# Patient Record
Sex: FEMALE | Race: AMERICAN INDIAN OR ALASKA NATIVE | NOT HISPANIC OR LATINO
[De-identification: names, ages, dates, MRNs, and addresses within clinical notes are randomized per-mention and may not be internally consistent; named-entity substitution may affect disease eponyms.]

---

## 2019-05-08 PROBLEM — Z00.00 ENCOUNTER FOR PREVENTIVE HEALTH EXAMINATION: Status: ACTIVE | Noted: 2019-05-08

## 2019-05-28 ENCOUNTER — APPOINTMENT (OUTPATIENT)
Dept: NEUROSURGERY | Facility: CLINIC | Age: 54
End: 2019-05-28
Payer: MEDICAID

## 2019-05-28 VITALS — HEIGHT: 63 IN | WEIGHT: 145 LBS | BODY MASS INDEX: 25.69 KG/M2

## 2019-05-28 DIAGNOSIS — Z87.442 PERSONAL HISTORY OF URINARY CALCULI: ICD-10-CM

## 2019-05-28 DIAGNOSIS — Z82.49 FAMILY HISTORY OF ISCHEMIC HEART DISEASE AND OTHER DISEASES OF THE CIRCULATORY SYSTEM: ICD-10-CM

## 2019-05-28 DIAGNOSIS — Z86.39 PERSONAL HISTORY OF OTHER ENDOCRINE, NUTRITIONAL AND METABOLIC DISEASE: ICD-10-CM

## 2019-05-28 DIAGNOSIS — Z86.79 PERSONAL HISTORY OF OTHER DISEASES OF THE CIRCULATORY SYSTEM: ICD-10-CM

## 2019-05-28 DIAGNOSIS — Z83.3 FAMILY HISTORY OF DIABETES MELLITUS: ICD-10-CM

## 2019-05-28 PROCEDURE — 99204 OFFICE O/P NEW MOD 45 MIN: CPT

## 2019-05-28 RX ORDER — SIMVASTATIN 10 MG/1
10 TABLET, FILM COATED ORAL
Refills: 0 | Status: ACTIVE | COMMUNITY

## 2019-05-28 RX ORDER — OMEGA-3/DHA/EPA/FISH OIL 300-1000MG
1000 CAPSULE ORAL
Refills: 0 | Status: ACTIVE | COMMUNITY

## 2019-05-28 RX ORDER — LOSARTAN POTASSIUM 25 MG/1
25 TABLET, FILM COATED ORAL
Refills: 0 | Status: ACTIVE | COMMUNITY

## 2019-05-28 RX ORDER — METOPROLOL TARTRATE 25 MG/1
25 TABLET, FILM COATED ORAL
Refills: 0 | Status: ACTIVE | COMMUNITY

## 2019-05-28 RX ORDER — ALOGLIPTIN 25 MG/1
25 TABLET, FILM COATED ORAL
Refills: 0 | Status: ACTIVE | COMMUNITY

## 2019-05-28 RX ORDER — VITAMIN E (DL,TOCOPHERYL ACET) 180 MG
CAPSULE ORAL
Refills: 0 | Status: ACTIVE | COMMUNITY

## 2019-05-28 RX ORDER — GABAPENTIN 300 MG
300 TABLET ORAL
Refills: 0 | Status: ACTIVE | COMMUNITY

## 2019-05-28 RX ORDER — B-COMPLEX WITH VITAMIN C
TABLET ORAL
Refills: 0 | Status: ACTIVE | COMMUNITY

## 2019-05-28 RX ORDER — METFORMIN HYDROCHLORIDE 500 MG/1
500 TABLET, COATED ORAL
Refills: 0 | Status: ACTIVE | COMMUNITY

## 2019-05-28 RX ORDER — ASPIRIN 81 MG/1
81 TABLET, CHEWABLE ORAL
Refills: 0 | Status: ACTIVE | COMMUNITY

## 2019-05-30 NOTE — HISTORY OF PRESENT ILLNESS
[de-identified] : This is a 54 yrs old Cantonese speaking only female (  ID #[id number]) who presents today for a consultation of low back pain radiating to the right posterior leg down to her ankle since October 2018. It is associated with numbness and occasional tingling and weakness. Patient has tried massage and acupuncture, which she reports did not alleviate her symptoms. She has never participated in physical therapy. Patient was referred by Dr. Gentile who has given injections to the patient which was working in the past, but recently the injections stopped providing relief to the patient. \par \par MRI of the lumbar spine without contrast done on 4/25/19 showed L4-5 severe facet degenerative bilaterally. Anterolisthesis of L4 on L5 due to facet degenerative disease. L5-S1 moderate foraminal narrowing.

## 2019-06-04 ENCOUNTER — APPOINTMENT (OUTPATIENT)
Dept: NEUROSURGERY | Facility: CLINIC | Age: 54
End: 2019-06-04
Payer: MEDICAID

## 2019-06-04 VITALS — WEIGHT: 145 LBS | HEIGHT: 63 IN | BODY MASS INDEX: 25.69 KG/M2

## 2019-06-04 PROCEDURE — 99215 OFFICE O/P EST HI 40 MIN: CPT

## 2019-06-04 RX ORDER — FENOFIBRATE 160 MG/1
160 TABLET ORAL
Refills: 0 | Status: ACTIVE | COMMUNITY

## 2019-06-10 NOTE — HISTORY OF PRESENT ILLNESS
[FreeTextEntry1] : Patient presents today accompanied by her son, used Cantonese  ID #[id number], complaining of low back pain radiating posterior to the right leg. It is associated with numbness, tingling, and weakness. She is here with an xray of the lumbar spine done on 5/28/19 showed grade 1 anterolisthesis of L4 over L5 with left facet arthropathy without dynamic instability.

## 2019-06-21 RX ORDER — IBUPROFEN 600 MG/1
600 TABLET, FILM COATED ORAL 3 TIMES DAILY
Qty: 90 | Refills: 2 | Status: ACTIVE | COMMUNITY
Start: 1900-01-01 | End: 1900-01-01

## 2019-07-05 ENCOUNTER — INPATIENT (INPATIENT)
Facility: HOSPITAL | Age: 54
LOS: 3 days | Discharge: HOME | End: 2019-07-09
Attending: STUDENT IN AN ORGANIZED HEALTH CARE EDUCATION/TRAINING PROGRAM | Admitting: STUDENT IN AN ORGANIZED HEALTH CARE EDUCATION/TRAINING PROGRAM
Payer: MEDICAID

## 2019-07-05 ENCOUNTER — RESULT REVIEW (OUTPATIENT)
Age: 54
End: 2019-07-05

## 2019-07-05 VITALS
TEMPERATURE: 98 F | WEIGHT: 149.03 LBS | HEIGHT: 63 IN | SYSTOLIC BLOOD PRESSURE: 139 MMHG | HEART RATE: 71 BPM | RESPIRATION RATE: 18 BRPM | DIASTOLIC BLOOD PRESSURE: 94 MMHG

## 2019-07-05 DIAGNOSIS — M43.16 SPONDYLOLISTHESIS, LUMBAR REGION: ICD-10-CM

## 2019-07-05 DIAGNOSIS — I10 ESSENTIAL (PRIMARY) HYPERTENSION: ICD-10-CM

## 2019-07-05 DIAGNOSIS — Z87.442 PERSONAL HISTORY OF URINARY CALCULI: Chronic | ICD-10-CM

## 2019-07-05 DIAGNOSIS — Z98.890 OTHER SPECIFIED POSTPROCEDURAL STATES: Chronic | ICD-10-CM

## 2019-07-05 DIAGNOSIS — M48.061 SPINAL STENOSIS, LUMBAR REGION WITHOUT NEUROGENIC CLAUDICATION: ICD-10-CM

## 2019-07-05 DIAGNOSIS — E11.9 TYPE 2 DIABETES MELLITUS WITHOUT COMPLICATIONS: ICD-10-CM

## 2019-07-05 DIAGNOSIS — M54.16 RADICULOPATHY, LUMBAR REGION: ICD-10-CM

## 2019-07-05 DIAGNOSIS — Z87.442 PERSONAL HISTORY OF URINARY CALCULI: ICD-10-CM

## 2019-07-05 DIAGNOSIS — E78.5 HYPERLIPIDEMIA, UNSPECIFIED: ICD-10-CM

## 2019-07-05 LAB
GLUCOSE BLDC GLUCOMTR-MCNC: 134 MG/DL — HIGH (ref 70–99)
GLUCOSE BLDC GLUCOMTR-MCNC: 156 MG/DL — HIGH (ref 70–99)
GLUCOSE BLDC GLUCOMTR-MCNC: 164 MG/DL — HIGH (ref 70–99)
GLUCOSE BLDC GLUCOMTR-MCNC: 181 MG/DL — HIGH (ref 70–99)

## 2019-07-05 PROCEDURE — 22853 INSJ BIOMECHANICAL DEVICE: CPT

## 2019-07-05 PROCEDURE — 22630 ARTHRD PST TQ 1NTRSPC LUM: CPT

## 2019-07-05 PROCEDURE — 99024 POSTOP FOLLOW-UP VISIT: CPT

## 2019-07-05 PROCEDURE — 22840 INSERT SPINE FIXATION DEVICE: CPT

## 2019-07-05 PROCEDURE — 63012 REMOVE LAMINA/FACETS LUMBAR: CPT | Mod: AS

## 2019-07-05 PROCEDURE — 88304 TISSUE EXAM BY PATHOLOGIST: CPT | Mod: 26

## 2019-07-05 PROCEDURE — 22853 INSJ BIOMECHANICAL DEVICE: CPT | Mod: AS

## 2019-07-05 PROCEDURE — 88311 DECALCIFY TISSUE: CPT | Mod: 26

## 2019-07-05 PROCEDURE — 63012 REMOVE LAMINA/FACETS LUMBAR: CPT | Mod: 59

## 2019-07-05 PROCEDURE — 22840 INSERT SPINE FIXATION DEVICE: CPT | Mod: AS

## 2019-07-05 RX ORDER — METOPROLOL TARTRATE 50 MG
1 TABLET ORAL
Qty: 0 | Refills: 0 | DISCHARGE

## 2019-07-05 RX ORDER — HEPARIN SODIUM 5000 [USP'U]/ML
5000 INJECTION INTRAVENOUS; SUBCUTANEOUS EVERY 8 HOURS
Refills: 0 | Status: DISCONTINUED | OUTPATIENT
Start: 2019-07-06 | End: 2019-07-09

## 2019-07-05 RX ORDER — CEFAZOLIN SODIUM 1 G
1000 VIAL (EA) INJECTION EVERY 8 HOURS
Refills: 0 | Status: COMPLETED | OUTPATIENT
Start: 2019-07-05 | End: 2019-07-06

## 2019-07-05 RX ORDER — DEXAMETHASONE 0.5 MG/5ML
4 ELIXIR ORAL ONCE
Refills: 0 | Status: DISCONTINUED | OUTPATIENT
Start: 2019-07-05 | End: 2019-07-05

## 2019-07-05 RX ORDER — INSULIN LISPRO 100/ML
5 VIAL (ML) SUBCUTANEOUS
Refills: 0 | Status: DISCONTINUED | OUTPATIENT
Start: 2019-07-05 | End: 2019-07-09

## 2019-07-05 RX ORDER — FENOFIBRATE,MICRONIZED 130 MG
1 CAPSULE ORAL
Qty: 0 | Refills: 0 | DISCHARGE

## 2019-07-05 RX ORDER — DEXTROSE 50 % IN WATER 50 %
15 SYRINGE (ML) INTRAVENOUS ONCE
Refills: 0 | Status: DISCONTINUED | OUTPATIENT
Start: 2019-07-05 | End: 2019-07-09

## 2019-07-05 RX ORDER — METOPROLOL TARTRATE 50 MG
25 TABLET ORAL DAILY
Refills: 0 | Status: DISCONTINUED | OUTPATIENT
Start: 2019-07-05 | End: 2019-07-09

## 2019-07-05 RX ORDER — DEXTROSE 50 % IN WATER 50 %
25 SYRINGE (ML) INTRAVENOUS ONCE
Refills: 0 | Status: DISCONTINUED | OUTPATIENT
Start: 2019-07-05 | End: 2019-07-09

## 2019-07-05 RX ORDER — LOSARTAN POTASSIUM 100 MG/1
25 TABLET, FILM COATED ORAL DAILY
Refills: 0 | Status: DISCONTINUED | OUTPATIENT
Start: 2019-07-05 | End: 2019-07-09

## 2019-07-05 RX ORDER — LOSARTAN POTASSIUM 100 MG/1
1 TABLET, FILM COATED ORAL
Qty: 0 | Refills: 0 | DISCHARGE

## 2019-07-05 RX ORDER — ALOGLIPTIN 12.5 MG/1
1 TABLET, FILM COATED ORAL
Qty: 0 | Refills: 0 | DISCHARGE

## 2019-07-05 RX ORDER — MORPHINE SULFATE 50 MG/1
4 CAPSULE, EXTENDED RELEASE ORAL
Refills: 0 | Status: DISCONTINUED | OUTPATIENT
Start: 2019-07-05 | End: 2019-07-05

## 2019-07-05 RX ORDER — ONDANSETRON 8 MG/1
4 TABLET, FILM COATED ORAL ONCE
Refills: 0 | Status: DISCONTINUED | OUTPATIENT
Start: 2019-07-05 | End: 2019-07-05

## 2019-07-05 RX ORDER — DEXTROSE 50 % IN WATER 50 %
12.5 SYRINGE (ML) INTRAVENOUS ONCE
Refills: 0 | Status: DISCONTINUED | OUTPATIENT
Start: 2019-07-05 | End: 2019-07-09

## 2019-07-05 RX ORDER — CHOLECALCIFEROL (VITAMIN D3) 125 MCG
500000 CAPSULE ORAL
Qty: 0 | Refills: 0 | DISCHARGE

## 2019-07-05 RX ORDER — DOCUSATE SODIUM 100 MG
100 CAPSULE ORAL THREE TIMES A DAY
Refills: 0 | Status: DISCONTINUED | OUTPATIENT
Start: 2019-07-05 | End: 2019-07-09

## 2019-07-05 RX ORDER — INSULIN GLARGINE 100 [IU]/ML
15 INJECTION, SOLUTION SUBCUTANEOUS AT BEDTIME
Refills: 0 | Status: DISCONTINUED | OUTPATIENT
Start: 2019-07-05 | End: 2019-07-09

## 2019-07-05 RX ORDER — FENOFIBRATE,MICRONIZED 130 MG
145 CAPSULE ORAL DAILY
Refills: 0 | Status: DISCONTINUED | OUTPATIENT
Start: 2019-07-05 | End: 2019-07-09

## 2019-07-05 RX ORDER — SODIUM CHLORIDE 9 MG/ML
1000 INJECTION INTRAMUSCULAR; INTRAVENOUS; SUBCUTANEOUS
Refills: 0 | Status: DISCONTINUED | OUTPATIENT
Start: 2019-07-05 | End: 2019-07-06

## 2019-07-05 RX ORDER — SENNA PLUS 8.6 MG/1
2 TABLET ORAL AT BEDTIME
Refills: 0 | Status: DISCONTINUED | OUTPATIENT
Start: 2019-07-05 | End: 2019-07-09

## 2019-07-05 RX ORDER — GLUCAGON INJECTION, SOLUTION 0.5 MG/.1ML
1 INJECTION, SOLUTION SUBCUTANEOUS ONCE
Refills: 0 | Status: DISCONTINUED | OUTPATIENT
Start: 2019-07-05 | End: 2019-07-09

## 2019-07-05 RX ORDER — SIMVASTATIN 20 MG/1
10 TABLET, FILM COATED ORAL AT BEDTIME
Refills: 0 | Status: DISCONTINUED | OUTPATIENT
Start: 2019-07-05 | End: 2019-07-09

## 2019-07-05 RX ORDER — SODIUM CHLORIDE 9 MG/ML
1000 INJECTION, SOLUTION INTRAVENOUS
Refills: 0 | Status: DISCONTINUED | OUTPATIENT
Start: 2019-07-05 | End: 2019-07-09

## 2019-07-05 RX ORDER — METFORMIN HYDROCHLORIDE 850 MG/1
1 TABLET ORAL
Qty: 0 | Refills: 0 | DISCHARGE

## 2019-07-05 RX ORDER — SIMVASTATIN 20 MG/1
1 TABLET, FILM COATED ORAL
Qty: 0 | Refills: 0 | DISCHARGE

## 2019-07-05 RX ORDER — MORPHINE SULFATE 50 MG/1
30 CAPSULE, EXTENDED RELEASE ORAL
Refills: 0 | Status: DISCONTINUED | OUTPATIENT
Start: 2019-07-05 | End: 2019-07-06

## 2019-07-05 RX ORDER — METHOCARBAMOL 500 MG/1
750 TABLET, FILM COATED ORAL EVERY 8 HOURS
Refills: 0 | Status: DISCONTINUED | OUTPATIENT
Start: 2019-07-05 | End: 2019-07-09

## 2019-07-05 RX ORDER — ACETAMINOPHEN 500 MG
650 TABLET ORAL EVERY 4 HOURS
Refills: 0 | Status: DISCONTINUED | OUTPATIENT
Start: 2019-07-05 | End: 2019-07-09

## 2019-07-05 RX ORDER — INSULIN LISPRO 100/ML
VIAL (ML) SUBCUTANEOUS
Refills: 0 | Status: DISCONTINUED | OUTPATIENT
Start: 2019-07-05 | End: 2019-07-09

## 2019-07-05 RX ORDER — MORPHINE SULFATE 50 MG/1
2 CAPSULE, EXTENDED RELEASE ORAL
Refills: 0 | Status: DISCONTINUED | OUTPATIENT
Start: 2019-07-05 | End: 2019-07-05

## 2019-07-05 RX ORDER — SODIUM CHLORIDE 9 MG/ML
1000 INJECTION INTRAMUSCULAR; INTRAVENOUS; SUBCUTANEOUS
Refills: 0 | Status: DISCONTINUED | OUTPATIENT
Start: 2019-07-05 | End: 2019-07-05

## 2019-07-05 RX ORDER — MEPERIDINE HYDROCHLORIDE 50 MG/ML
12.5 INJECTION INTRAMUSCULAR; INTRAVENOUS; SUBCUTANEOUS
Refills: 0 | Status: DISCONTINUED | OUTPATIENT
Start: 2019-07-05 | End: 2019-07-05

## 2019-07-05 RX ADMIN — MORPHINE SULFATE 2 MILLIGRAM(S): 50 CAPSULE, EXTENDED RELEASE ORAL at 15:00

## 2019-07-05 RX ADMIN — Medication 2: at 18:04

## 2019-07-05 RX ADMIN — SENNA PLUS 2 TABLET(S): 8.6 TABLET ORAL at 21:04

## 2019-07-05 RX ADMIN — Medication 100 MILLIGRAM(S): at 21:04

## 2019-07-05 RX ADMIN — Medication 100 MILLIGRAM(S): at 21:03

## 2019-07-05 RX ADMIN — SIMVASTATIN 10 MILLIGRAM(S): 20 TABLET, FILM COATED ORAL at 21:04

## 2019-07-05 RX ADMIN — MORPHINE SULFATE 2 MILLIGRAM(S): 50 CAPSULE, EXTENDED RELEASE ORAL at 15:24

## 2019-07-05 RX ADMIN — INSULIN GLARGINE 15 UNIT(S): 100 INJECTION, SOLUTION SUBCUTANEOUS at 23:08

## 2019-07-05 RX ADMIN — SODIUM CHLORIDE 75 MILLILITER(S): 9 INJECTION INTRAMUSCULAR; INTRAVENOUS; SUBCUTANEOUS at 14:26

## 2019-07-05 RX ADMIN — MORPHINE SULFATE 30 MILLILITER(S): 50 CAPSULE, EXTENDED RELEASE ORAL at 15:30

## 2019-07-05 NOTE — ASU PREOP CHECKLIST - 1.
[facility]  # [id number].  Patient declined pacific  and requested for Hung Pan, son to interpretret.

## 2019-07-05 NOTE — ASU PATIENT PROFILE, ADULT - INTERPRETATION SERVICES DECLINED
Patient/Caregiver requests family/friend to interpret. #[id number] refused interperter Emani/Patient/Caregiver requests family/friend to interpret.

## 2019-07-05 NOTE — CHART NOTE - NSCHARTNOTEFT_GEN_A_CORE
PACU ANESTHESIA ADMISSION NOTE      Procedure: lumbar 4-5 transforaminal interbody fusion    Post op diagnosis:  lumbar spondylosis     ____  Intubated  TV:______       Rate: ______      FiO2: ______    _x___  Patent Airway    _x___  Full return of protective reflexes    _x___  Full recovery from anesthesia / back to baseline status    Vitals:  T(F): 97.5   HR: 76  BP: 144/81  RR: 19  SpO2: 96%    Mental Status:  _x___ Awake   __x___ Alert   _____ Drowsy   _____ Sedated    Nausea/Vomiting:  _x___  NO       ______Yes,   See Post - Op Orders         Pain Scale (0-10):  __0___    Treatment: ____ None    _x___ See Post - Op/PCA Orders    Post - Operative Fluids:   ____ Oral   _x___ See Post - Op Orders    Plan: Discharge:   ____Home       __x___Floor     _____Critical Care    _____  Other:_________________    Comments:  No anesthesia issues or complications noted.  Discharge when criteria met.

## 2019-07-05 NOTE — BRIEF OPERATIVE NOTE - OPERATION/FINDINGS
grade I spondylolisthesis, severe facet arthropathy, foraminal stenosis. Right L4-5TLIF, K2M instrumentation

## 2019-07-06 LAB
ANION GAP SERPL CALC-SCNC: 11 MMOL/L — SIGNIFICANT CHANGE UP (ref 7–14)
BASOPHILS # BLD AUTO: 0.03 K/UL — SIGNIFICANT CHANGE UP (ref 0–0.2)
BASOPHILS NFR BLD AUTO: 0.4 % — SIGNIFICANT CHANGE UP (ref 0–1)
BUN SERPL-MCNC: 13 MG/DL — SIGNIFICANT CHANGE UP (ref 10–20)
CALCIUM SERPL-MCNC: 9 MG/DL — SIGNIFICANT CHANGE UP (ref 8.5–10.1)
CHLORIDE SERPL-SCNC: 106 MMOL/L — SIGNIFICANT CHANGE UP (ref 98–110)
CO2 SERPL-SCNC: 25 MMOL/L — SIGNIFICANT CHANGE UP (ref 17–32)
CREAT SERPL-MCNC: 0.6 MG/DL — LOW (ref 0.7–1.5)
EOSINOPHIL # BLD AUTO: 0.01 K/UL — SIGNIFICANT CHANGE UP (ref 0–0.7)
EOSINOPHIL NFR BLD AUTO: 0.1 % — SIGNIFICANT CHANGE UP (ref 0–8)
ESTIMATED AVERAGE GLUCOSE: 128 MG/DL — HIGH (ref 68–114)
GLUCOSE BLDC GLUCOMTR-MCNC: 136 MG/DL — HIGH (ref 70–99)
GLUCOSE BLDC GLUCOMTR-MCNC: 141 MG/DL — HIGH (ref 70–99)
GLUCOSE BLDC GLUCOMTR-MCNC: 147 MG/DL — HIGH (ref 70–99)
GLUCOSE BLDC GLUCOMTR-MCNC: 206 MG/DL — HIGH (ref 70–99)
GLUCOSE SERPL-MCNC: 131 MG/DL — HIGH (ref 70–99)
HBA1C BLD-MCNC: 6.1 % — HIGH (ref 4–5.6)
HCT VFR BLD CALC: 32.1 % — LOW (ref 37–47)
HGB BLD-MCNC: 10.7 G/DL — LOW (ref 12–16)
IMM GRANULOCYTES NFR BLD AUTO: 0.3 % — SIGNIFICANT CHANGE UP (ref 0.1–0.3)
LYMPHOCYTES # BLD AUTO: 1.13 K/UL — LOW (ref 1.2–3.4)
LYMPHOCYTES # BLD AUTO: 14.2 % — LOW (ref 20.5–51.1)
MCHC RBC-ENTMCNC: 29.1 PG — SIGNIFICANT CHANGE UP (ref 27–31)
MCHC RBC-ENTMCNC: 33.3 G/DL — SIGNIFICANT CHANGE UP (ref 32–37)
MCV RBC AUTO: 87.2 FL — SIGNIFICANT CHANGE UP (ref 81–99)
MONOCYTES # BLD AUTO: 0.46 K/UL — SIGNIFICANT CHANGE UP (ref 0.1–0.6)
MONOCYTES NFR BLD AUTO: 5.8 % — SIGNIFICANT CHANGE UP (ref 1.7–9.3)
NEUTROPHILS # BLD AUTO: 6.28 K/UL — SIGNIFICANT CHANGE UP (ref 1.4–6.5)
NEUTROPHILS NFR BLD AUTO: 79.2 % — HIGH (ref 42.2–75.2)
NRBC # BLD: 0 /100 WBCS — SIGNIFICANT CHANGE UP (ref 0–0)
PLATELET # BLD AUTO: 239 K/UL — SIGNIFICANT CHANGE UP (ref 130–400)
POTASSIUM SERPL-MCNC: 3.7 MMOL/L — SIGNIFICANT CHANGE UP (ref 3.5–5)
POTASSIUM SERPL-SCNC: 3.7 MMOL/L — SIGNIFICANT CHANGE UP (ref 3.5–5)
RBC # BLD: 3.68 M/UL — LOW (ref 4.2–5.4)
RBC # FLD: 11.9 % — SIGNIFICANT CHANGE UP (ref 11.5–14.5)
SODIUM SERPL-SCNC: 142 MMOL/L — SIGNIFICANT CHANGE UP (ref 135–146)
WBC # BLD: 7.93 K/UL — SIGNIFICANT CHANGE UP (ref 4.8–10.8)
WBC # FLD AUTO: 7.93 K/UL — SIGNIFICANT CHANGE UP (ref 4.8–10.8)

## 2019-07-06 PROCEDURE — 99024 POSTOP FOLLOW-UP VISIT: CPT

## 2019-07-06 RX ORDER — SODIUM CHLORIDE 9 MG/ML
500 INJECTION INTRAMUSCULAR; INTRAVENOUS; SUBCUTANEOUS ONCE
Refills: 0 | Status: COMPLETED | OUTPATIENT
Start: 2019-07-06 | End: 2019-07-06

## 2019-07-06 RX ORDER — OXYCODONE AND ACETAMINOPHEN 5; 325 MG/1; MG/1
2 TABLET ORAL EVERY 4 HOURS
Refills: 0 | Status: DISCONTINUED | OUTPATIENT
Start: 2019-07-06 | End: 2019-07-09

## 2019-07-06 RX ORDER — HEPARIN SODIUM 5000 [USP'U]/ML
5000 INJECTION INTRAVENOUS; SUBCUTANEOUS EVERY 8 HOURS
Refills: 0 | Status: DISCONTINUED | OUTPATIENT
Start: 2019-07-06 | End: 2019-07-06

## 2019-07-06 RX ORDER — OXYCODONE AND ACETAMINOPHEN 5; 325 MG/1; MG/1
1 TABLET ORAL EVERY 4 HOURS
Refills: 0 | Status: DISCONTINUED | OUTPATIENT
Start: 2019-07-06 | End: 2019-07-09

## 2019-07-06 RX ORDER — PANTOPRAZOLE SODIUM 20 MG/1
40 TABLET, DELAYED RELEASE ORAL
Refills: 0 | Status: DISCONTINUED | OUTPATIENT
Start: 2019-07-06 | End: 2019-07-09

## 2019-07-06 RX ORDER — MORPHINE SULFATE 50 MG/1
2 CAPSULE, EXTENDED RELEASE ORAL EVERY 4 HOURS
Refills: 0 | Status: DISCONTINUED | OUTPATIENT
Start: 2019-07-06 | End: 2019-07-09

## 2019-07-06 RX ADMIN — Medication 145 MILLIGRAM(S): at 11:59

## 2019-07-06 RX ADMIN — Medication 100 MILLIGRAM(S): at 13:02

## 2019-07-06 RX ADMIN — SIMVASTATIN 10 MILLIGRAM(S): 20 TABLET, FILM COATED ORAL at 21:01

## 2019-07-06 RX ADMIN — Medication 25 MILLIGRAM(S): at 06:20

## 2019-07-06 RX ADMIN — SODIUM CHLORIDE 500 MILLILITER(S): 9 INJECTION INTRAMUSCULAR; INTRAVENOUS; SUBCUTANEOUS at 14:48

## 2019-07-06 RX ADMIN — HEPARIN SODIUM 5000 UNIT(S): 5000 INJECTION INTRAVENOUS; SUBCUTANEOUS at 13:02

## 2019-07-06 RX ADMIN — Medication 4: at 17:46

## 2019-07-06 RX ADMIN — Medication 5 UNIT(S): at 11:58

## 2019-07-06 RX ADMIN — METHOCARBAMOL 750 MILLIGRAM(S): 500 TABLET, FILM COATED ORAL at 06:31

## 2019-07-06 RX ADMIN — HEPARIN SODIUM 5000 UNIT(S): 5000 INJECTION INTRAVENOUS; SUBCUTANEOUS at 06:20

## 2019-07-06 RX ADMIN — Medication 100 MILLIGRAM(S): at 21:01

## 2019-07-06 RX ADMIN — Medication 5 UNIT(S): at 08:07

## 2019-07-06 RX ADMIN — Medication 100 MILLIGRAM(S): at 06:21

## 2019-07-06 RX ADMIN — Medication 100 MILLIGRAM(S): at 06:20

## 2019-07-06 RX ADMIN — Medication 5 UNIT(S): at 17:48

## 2019-07-06 RX ADMIN — HEPARIN SODIUM 5000 UNIT(S): 5000 INJECTION INTRAVENOUS; SUBCUTANEOUS at 21:01

## 2019-07-06 RX ADMIN — Medication 100 MILLIGRAM(S): at 13:05

## 2019-07-06 RX ADMIN — PANTOPRAZOLE SODIUM 40 MILLIGRAM(S): 20 TABLET, DELAYED RELEASE ORAL at 11:58

## 2019-07-06 RX ADMIN — SENNA PLUS 2 TABLET(S): 8.6 TABLET ORAL at 21:01

## 2019-07-06 RX ADMIN — LOSARTAN POTASSIUM 25 MILLIGRAM(S): 100 TABLET, FILM COATED ORAL at 06:20

## 2019-07-06 RX ADMIN — SODIUM CHLORIDE 75 MILLILITER(S): 9 INJECTION INTRAMUSCULAR; INTRAVENOUS; SUBCUTANEOUS at 05:10

## 2019-07-06 RX ADMIN — INSULIN GLARGINE 15 UNIT(S): 100 INJECTION, SOLUTION SUBCUTANEOUS at 21:59

## 2019-07-06 NOTE — PHYSICAL THERAPY INITIAL EVALUATION ADULT - GENERAL OBSERVATIONS, REHAB EVAL
Pt encountered seated in b/s chair (+) IV (+) incisions lower back clean and dry with bandaging. Agreeable to b/s PT

## 2019-07-06 NOTE — PHYSICAL THERAPY INITIAL EVALUATION ADULT - DISCHARGE DISPOSITION, PT EVAL
home w/ home PT/home with home PT vs rehabilitation facility based on pt progress, pain, and ability to negotiate stairs. Pt lives alone./rehabilitation facility

## 2019-07-06 NOTE — PHYSICAL THERAPY INITIAL EVALUATION ADULT - ADDITIONAL COMMENTS
Pt reports she lives alone, 4 steps to get in and an additional flight to bedroom.  Reports independent prior to surgery

## 2019-07-06 NOTE — CONSULT NOTE ADULT - ASSESSMENT
IMPRESSION: Rehab of LS stenosis post TLIF L4 L5    PRECAUTIONS: [    ] Cardiac  [    ] Respiratory  [    ] Seizures [    ] Contact Isolation  [    ] Droplet Isolation  [    ] Other    Weight Bearing Status: WBAT    RECOMMENDATION:    Out of Bed to Chair     DVT/Decubiti Prophylaxis    REHAB PLAN:     [  x   ] Bedside P/T 3-5 times a week   [ x    ] Bedside O/T  2-3 times a week   [     ] No Rehab Therapy Indicated   [     ]  Speech Therapy   Conditioning/ROM                                 ADL  Bed Mobility                                            Conditioning/ROM  Transfers                                                  Bed Mobility  Sitting /Standing Balance                      Transfers                                        Gait Training                                            Sitting/Standing Balance  Stair Training [   ]Applicable                 Home equipment Eval                                                                     Splinting  [   ] Only      GOALS:   ADL   [    ]   Independent         Transfers  [    ] Independent            Ambulation  [     ] Independent     [     ] With device                            [ x   ]  CG                                               [  x  ]  CG                                                    [ x    ] CG                            [    ] Min A                                          [    ] Min A                                                [     ] Min  A          DISCHARGE PLAN:   [  x   ]  Good candidate for Intensive Rehabilitation/Hospital based-[facility]                                             Will tolerate 3hrs Intensive Rehab Daily                                       [      ]  Short Term Rehab in Skilled Nursing Facility                                       [      ]  Home with Outpatient or  services                                         [      ]  Possible Candidate for Intensive Hospital based Rehab

## 2019-07-06 NOTE — CONSULT NOTE ADULT - SUBJECTIVE AND OBJECTIVE BOX
Patient is a 54y old  Female who presents with a chief complaint of LSS stenosis   HPI:      PAST MEDICAL & SURGICAL HISTORY:  Back pain  HTN (hypertension)  HLD (hyperlipidemia)  Diabetes  History of kidney stones: lipotripsy small kidney stones history  History of bladder surgery      Hospital Course: post TLIF 7-5-19 L4 L 5    TODAY'S SUBJECTIVE & REVIEW OF SYMPTOMS:     Constitutional WNL   Cardio WNL   Resp SOB   GI WNL  Heme WNL  Endo WNL  Skin WNL  MSK stenosis  Neuro numbness tingling weakness  Cognitive WNL  Psych WNL      MEDICATIONS  (STANDING):  dextrose 5%. 1000 milliLiter(s) (50 mL/Hr) IV Continuous <Continuous>  dextrose 50% Injectable 12.5 Gram(s) IV Push once  dextrose 50% Injectable 25 Gram(s) IV Push once  dextrose 50% Injectable 25 Gram(s) IV Push once  docusate sodium 100 milliGRAM(s) Oral three times a day  fenofibrate Tablet 145 milliGRAM(s) Oral daily  heparin  Injectable 5000 Unit(s) SubCutaneous every 8 hours  insulin glargine Injectable (LANTUS) 15 Unit(s) SubCutaneous at bedtime  insulin lispro (HumaLOG) corrective regimen sliding scale   SubCutaneous three times a day before meals  insulin lispro Injectable (HumaLOG) 5 Unit(s) SubCutaneous before breakfast  insulin lispro Injectable (HumaLOG) 5 Unit(s) SubCutaneous before lunch  insulin lispro Injectable (HumaLOG) 5 Unit(s) SubCutaneous before dinner  losartan 25 milliGRAM(s) Oral daily  metoprolol succinate ER 25 milliGRAM(s) Oral daily  pantoprazole    Tablet 40 milliGRAM(s) Oral before breakfast  senna 2 Tablet(s) Oral at bedtime  simvastatin 10 milliGRAM(s) Oral at bedtime    MEDICATIONS  (PRN):  acetaminophen   Tablet .. 650 milliGRAM(s) Oral every 4 hours PRN Temp greater or equal to 38C (100.4F), Mild Pain (1 - 3)  dextrose 40% Gel 15 Gram(s) Oral once PRN Blood Glucose LESS THAN 70 milliGRAM(s)/deciliter  glucagon  Injectable 1 milliGRAM(s) IntraMuscular once PRN Glucose LESS THAN 70 milligrams/deciliter  methocarbamol 750 milliGRAM(s) Oral every 8 hours PRN Back Spasms  morphine  - Injectable 2 milliGRAM(s) IV Push every 4 hours PRN Severe Pain (7 - 10)  oxyCODONE    5 mG/acetaminophen 325 mG 1 Tablet(s) Oral every 4 hours PRN Mild Pain (1 - 3)  oxyCODONE    5 mG/acetaminophen 325 mG 2 Tablet(s) Oral every 4 hours PRN Moderate Pain (4 - 6)      FAMILY HISTORY:      Allergies    No Known Allergies    Intolerances        SOCIAL HISTORY:    [    ] Etoh  [    ] Smoking  [    ] Substance abuse     Home Environment:  [    ] Home Alone  [    ] Lives with Family  [    ] Home Health Aid    Dwelling:  [    ] Apartment  [    ] Private House  [    ] Adult Home  [    ] Skilled Nursing Facility      [    ] Short Term  [    ] Long Term  [    ] Stairs                           [    ] Elevator     FUNCTIONAL STATUS PTA: (Check all that apply)  Ambulation: [     ]Independent    [    ] Dependent     [    ] Non-Ambulatory  Assistive Device: [    ] SA Cane  [    ]  Q Cane  [    ] Walker  [    ]  Wheelchair  ADL : [    ] Independent  [    ]  Dependent       Vital Signs Last 24 Hrs  T(C): 36.4 (06 Jul 2019 14:49), Max: 36.5 (05 Jul 2019 19:00)  T(F): 97.6 (06 Jul 2019 14:49), Max: 97.7 (05 Jul 2019 19:00)  HR: 88 (06 Jul 2019 17:58) (72 - 88)  BP: 102/60 (06 Jul 2019 17:58) (91/54 - 126/76)  BP(mean): --  RR: 18 (06 Jul 2019 17:58) (16 - 18)  SpO2: 99% (06 Jul 2019 17:58) (97% - 99%)      PHYSICAL EXAM: Alert & Oriented X3  GENERAL: NAD, well-groomed, well-developed  HEAD:  Atraumatic, Normocephalic  EYES: EOMI, PERRLA, conjunctiva and sclera clear  NECK: Supple, No JVD, Normal thyroid  CHEST/LUNG: Clear to percussion bilaterally; No rales, rhonchi, wheezing, or rubs  HEART: Regular rate and rhythm; No murmurs, rubs, or gallops  ABDOMEN: Soft, Nontender, Nondistended; Bowel sounds present  EXTREMITIES:  2+ Peripheral Pulses, No clubbing, cyanosis, or edema    NERVOUS SYSTEM:  Cranial Nerves 2-12 intact [    ] Abnormal  [    ]  ROM: WFL all extremities [    ]  Abnormal [     ]  Motor Strength: WFL all extremities  [    ]  Abnormal [    ]  Sensation: intact to light touch [    ] Abnormal [    ]  Reflexes: Symmetric [    ]  Abnormal [    ]    FUNCTIONAL STATUS:  Bed Mobility: [   ]  Independent [    ]  Supervision [    ]  Needs Assistance [  ]  N/A  Transfers: [    ]  Independent [    ]  Supervision [    ]  Needs Assistance [    ]  N/A    Ambulation:  [    ]  Independent [    ]  Supervision [    ]  Needs Assistance [    ]  N/A   ADL:  [    ]   Independent [    ] Requires Assistance [    ] N/A       LABS:                        10.7   7.93  )-----------( 239      ( 06 Jul 2019 06:19 )             32.1     07-06    142  |  106  |  13  ----------------------------<  131<H>  3.7   |  25  |  0.6<L>    Ca    9.0      06 Jul 2019 06:19            RADIOLOGY & ADDITIONAL STUDIES:

## 2019-07-06 NOTE — PROGRESS NOTE ADULT - SUBJECTIVE AND OBJECTIVE BOX
Subjective: 54yFemale with a pmhx of M54.16,[id number]  ^M54.16,64473,35995,[id number]  Back pain  HTN (hypertension)  HLD (hyperlipidemia)  Diabetes  Spondylolisthesis, lumbar region  Spondylolisthesis, lumbar region  TLIF, 1 level  History of kidney stones  History of bladder surgery    POD#1  s/p L4-L5 TLIF with pedicle screws  Pt seen and examined at bedside. c/o incisional pain and tingling down b/l LE's.  Denies radicular symptoms.      Allergies    No Known Allergies    Intolerances        Vital Signs Last 24 Hrs  T(C): 36.1 (06 Jul 2019 05:51), Max: 36.5 (05 Jul 2019 15:25)  T(F): 97 (06 Jul 2019 05:51), Max: 97.7 (05 Jul 2019 15:25)  HR: 87 (06 Jul 2019 12:13) (63 - 87)  BP: 91/54 (06 Jul 2019 12:13) (91/54 - 136/79)  BP(mean): --  RR: 18 (06 Jul 2019 12:13) (14 - 20)  SpO2: 97% (06 Jul 2019 12:13) (96% - 99%)      acetaminophen   Tablet .. 650 milliGRAM(s) Oral every 4 hours PRN  dextrose 40% Gel 15 Gram(s) Oral once PRN  dextrose 5%. 1000 milliLiter(s) IV Continuous <Continuous>  dextrose 50% Injectable 12.5 Gram(s) IV Push once  dextrose 50% Injectable 25 Gram(s) IV Push once  dextrose 50% Injectable 25 Gram(s) IV Push once  docusate sodium 100 milliGRAM(s) Oral three times a day  fenofibrate Tablet 145 milliGRAM(s) Oral daily  glucagon  Injectable 1 milliGRAM(s) IntraMuscular once PRN  heparin  Injectable 5000 Unit(s) SubCutaneous every 8 hours  insulin glargine Injectable (LANTUS) 15 Unit(s) SubCutaneous at bedtime  insulin lispro (HumaLOG) corrective regimen sliding scale   SubCutaneous three times a day before meals  insulin lispro Injectable (HumaLOG) 5 Unit(s) SubCutaneous before breakfast  insulin lispro Injectable (HumaLOG) 5 Unit(s) SubCutaneous before lunch  insulin lispro Injectable (HumaLOG) 5 Unit(s) SubCutaneous before dinner  losartan 25 milliGRAM(s) Oral daily  methocarbamol 750 milliGRAM(s) Oral every 8 hours PRN  metoprolol succinate ER 25 milliGRAM(s) Oral daily  pantoprazole    Tablet 40 milliGRAM(s) Oral before breakfast  senna 2 Tablet(s) Oral at bedtime  simvastatin 10 milliGRAM(s) Oral at bedtime        07-05-19 @ 07:01  -  07-06-19 @ 07:00  --------------------------------------------------------  IN: 1282.5 mL / OUT: 2045 mL / NET: -762.5 mL    07-06-19 @ 07:01  -  07-06-19 @ 13:29  --------------------------------------------------------  IN: 0 mL / OUT: 700 mL / NET: -700 mL          Exam:  AAOX3. Verbal function intact  follows commands  Motor: MAEx4  5/5 power in b/l UE  good motor stregth LE  Sensation: intact     wound dressing intact        CBC Full  -  ( 06 Jul 2019 06:19 )  WBC Count : 7.93 K/uL  RBC Count : 3.68 M/uL  Hemoglobin : 10.7 g/dL  Hematocrit : 32.1 %  Platelet Count - Automated : 239 K/uL  Mean Cell Volume : 87.2 fL  Mean Cell Hemoglobin : 29.1 pg  Mean Cell Hemoglobin Concentration : 33.3 g/dL  Auto Neutrophil # : 6.28 K/uL  Auto Lymphocyte # : 1.13 K/uL  Auto Monocyte # : 0.46 K/uL  Auto Eosinophil # : 0.01 K/uL  Auto Basophil # : 0.03 K/uL  Auto Neutrophil % : 79.2 %  Auto Lymphocyte % : 14.2 %  Auto Monocyte % : 5.8 %  Auto Eosinophil % : 0.1 %  Auto Basophil % : 0.4 %    07-06    142  |  106  |  13  ----------------------------<  131<H>  3.7   |  25  |  0.6<L>    Ca    9.0      06 Jul 2019 06:19            Assessment/Plan: as above  d/c jean carlos  d/c PCA pump  d/c IVF  PT/rehab  start Heparin SQ  discussed w attg

## 2019-07-07 LAB
GLUCOSE BLDC GLUCOMTR-MCNC: 104 MG/DL — HIGH (ref 70–99)
GLUCOSE BLDC GLUCOMTR-MCNC: 118 MG/DL — HIGH (ref 70–99)
GLUCOSE BLDC GLUCOMTR-MCNC: 127 MG/DL — HIGH (ref 70–99)
GLUCOSE BLDC GLUCOMTR-MCNC: 201 MG/DL — HIGH (ref 70–99)

## 2019-07-07 PROCEDURE — 99024 POSTOP FOLLOW-UP VISIT: CPT

## 2019-07-07 RX ORDER — BACITRACIN ZINC 500 UNIT/G
1 OINTMENT IN PACKET (EA) TOPICAL EVERY 12 HOURS
Refills: 0 | Status: DISCONTINUED | OUTPATIENT
Start: 2019-07-07 | End: 2019-07-09

## 2019-07-07 RX ADMIN — Medication 100 MILLIGRAM(S): at 05:52

## 2019-07-07 RX ADMIN — HEPARIN SODIUM 5000 UNIT(S): 5000 INJECTION INTRAVENOUS; SUBCUTANEOUS at 14:12

## 2019-07-07 RX ADMIN — PANTOPRAZOLE SODIUM 40 MILLIGRAM(S): 20 TABLET, DELAYED RELEASE ORAL at 05:51

## 2019-07-07 RX ADMIN — SENNA PLUS 2 TABLET(S): 8.6 TABLET ORAL at 21:20

## 2019-07-07 RX ADMIN — OXYCODONE AND ACETAMINOPHEN 1 TABLET(S): 5; 325 TABLET ORAL at 21:25

## 2019-07-07 RX ADMIN — HEPARIN SODIUM 5000 UNIT(S): 5000 INJECTION INTRAVENOUS; SUBCUTANEOUS at 05:52

## 2019-07-07 RX ADMIN — Medication 1 APPLICATION(S): at 19:14

## 2019-07-07 RX ADMIN — Medication 650 MILLIGRAM(S): at 11:45

## 2019-07-07 RX ADMIN — Medication 5 UNIT(S): at 17:07

## 2019-07-07 RX ADMIN — METHOCARBAMOL 750 MILLIGRAM(S): 500 TABLET, FILM COATED ORAL at 11:45

## 2019-07-07 RX ADMIN — SIMVASTATIN 10 MILLIGRAM(S): 20 TABLET, FILM COATED ORAL at 21:20

## 2019-07-07 RX ADMIN — Medication 145 MILLIGRAM(S): at 11:39

## 2019-07-07 RX ADMIN — Medication 100 MILLIGRAM(S): at 14:12

## 2019-07-07 RX ADMIN — HEPARIN SODIUM 5000 UNIT(S): 5000 INJECTION INTRAVENOUS; SUBCUTANEOUS at 21:20

## 2019-07-07 RX ADMIN — OXYCODONE AND ACETAMINOPHEN 1 TABLET(S): 5; 325 TABLET ORAL at 02:24

## 2019-07-07 RX ADMIN — INSULIN GLARGINE 15 UNIT(S): 100 INJECTION, SOLUTION SUBCUTANEOUS at 22:42

## 2019-07-07 RX ADMIN — OXYCODONE AND ACETAMINOPHEN 1 TABLET(S): 5; 325 TABLET ORAL at 08:17

## 2019-07-07 RX ADMIN — Medication 5 UNIT(S): at 08:10

## 2019-07-07 RX ADMIN — Medication 5 UNIT(S): at 11:41

## 2019-07-07 RX ADMIN — Medication 100 MILLIGRAM(S): at 21:20

## 2019-07-07 RX ADMIN — Medication 4: at 11:41

## 2019-07-07 NOTE — PROGRESS NOTE ADULT - SUBJECTIVE AND OBJECTIVE BOX
Subjective: 54yFemale with a pmhx of M54.16,[id number]  ^M54.16,66224,31169,[id number]  Back pain  HTN (hypertension)  HLD (hyperlipidemia)  Diabetes  Spondylolisthesis, lumbar region  Spondylolisthesis, lumbar region  TLIF, 1 level  History of kidney stones  History of bladder surgery      POD#2  s/p L4-L5 TLIF with pedicle screws    Pt seen and examined at bedside. c/o incisional pain at this time.  Observed walking w RW with PT today.   Denies radicular symptoms.      Allergies    No Known Allergies    Intolerances        Vital Signs Last 24 Hrs  T(C): 36.2 (07 Jul 2019 13:17), Max: 36.9 (06 Jul 2019 21:38)  T(F): 97.1 (07 Jul 2019 13:17), Max: 98.4 (06 Jul 2019 21:38)  HR: 88 (07 Jul 2019 13:17) (77 - 88)  BP: 107/62 (07 Jul 2019 13:17) (100/65 - 109/64)  BP(mean): --  RR: 18 (07 Jul 2019 13:17) (16 - 18)  SpO2: 99% (06 Jul 2019 17:58) (99% - 99%)      acetaminophen   Tablet .. 650 milliGRAM(s) Oral every 4 hours PRN  dextrose 40% Gel 15 Gram(s) Oral once PRN  dextrose 5%. 1000 milliLiter(s) IV Continuous <Continuous>  dextrose 50% Injectable 12.5 Gram(s) IV Push once  dextrose 50% Injectable 25 Gram(s) IV Push once  dextrose 50% Injectable 25 Gram(s) IV Push once  docusate sodium 100 milliGRAM(s) Oral three times a day  fenofibrate Tablet 145 milliGRAM(s) Oral daily  glucagon  Injectable 1 milliGRAM(s) IntraMuscular once PRN  heparin  Injectable 5000 Unit(s) SubCutaneous every 8 hours  insulin glargine Injectable (LANTUS) 15 Unit(s) SubCutaneous at bedtime  insulin lispro (HumaLOG) corrective regimen sliding scale   SubCutaneous three times a day before meals  insulin lispro Injectable (HumaLOG) 5 Unit(s) SubCutaneous before breakfast  insulin lispro Injectable (HumaLOG) 5 Unit(s) SubCutaneous before lunch  insulin lispro Injectable (HumaLOG) 5 Unit(s) SubCutaneous before dinner  losartan 25 milliGRAM(s) Oral daily  methocarbamol 750 milliGRAM(s) Oral every 8 hours PRN  metoprolol succinate ER 25 milliGRAM(s) Oral daily  morphine  - Injectable 2 milliGRAM(s) IV Push every 4 hours PRN  oxyCODONE    5 mG/acetaminophen 325 mG 1 Tablet(s) Oral every 4 hours PRN  oxyCODONE    5 mG/acetaminophen 325 mG 2 Tablet(s) Oral every 4 hours PRN  pantoprazole    Tablet 40 milliGRAM(s) Oral before breakfast  senna 2 Tablet(s) Oral at bedtime  simvastatin 10 milliGRAM(s) Oral at bedtime        07-06-19 @ 07:01  -  07-07-19 @ 07:00  --------------------------------------------------------  IN: 500 mL / OUT: 700 mL / NET: -200 mL        Exam:  AAOX3. Verbal function intact  follows commands  Motor: MAEx4  5/5 power in b/l UE  good motor stregth LE  Sensation: intact       CBC Full  -  ( 06 Jul 2019 06:19 )  WBC Count : 7.93 K/uL  RBC Count : 3.68 M/uL  Hemoglobin : 10.7 g/dL  Hematocrit : 32.1 %  Platelet Count - Automated : 239 K/uL  Mean Cell Volume : 87.2 fL  Mean Cell Hemoglobin : 29.1 pg  Mean Cell Hemoglobin Concentration : 33.3 g/dL  Auto Neutrophil # : 6.28 K/uL  Auto Lymphocyte # : 1.13 K/uL  Auto Monocyte # : 0.46 K/uL  Auto Eosinophil # : 0.01 K/uL  Auto Basophil # : 0.03 K/uL  Auto Neutrophil % : 79.2 %  Auto Lymphocyte % : 14.2 %  Auto Monocyte % : 5.8 %  Auto Eosinophil % : 0.1 %  Auto Basophil % : 0.4 %    07-06    142  |  106  |  13  ----------------------------<  131<H>  3.7   |  25  |  0.6<L>    Ca    9.0      06 Jul 2019 06:19              Wound: dressing changed, covered w DSD      Assessment/Plan: as above  PT/rehab  pain meds  will discuss w attg

## 2019-07-08 LAB
GLUCOSE BLDC GLUCOMTR-MCNC: 111 MG/DL — HIGH (ref 70–99)
GLUCOSE BLDC GLUCOMTR-MCNC: 129 MG/DL — HIGH (ref 70–99)
GLUCOSE BLDC GLUCOMTR-MCNC: 171 MG/DL — HIGH (ref 70–99)
GLUCOSE BLDC GLUCOMTR-MCNC: 88 MG/DL — SIGNIFICANT CHANGE UP (ref 70–99)

## 2019-07-08 PROCEDURE — 99024 POSTOP FOLLOW-UP VISIT: CPT

## 2019-07-08 RX ORDER — POLYETHYLENE GLYCOL 3350 17 G/17G
17 POWDER, FOR SOLUTION ORAL ONCE
Refills: 0 | Status: COMPLETED | OUTPATIENT
Start: 2019-07-08 | End: 2019-07-08

## 2019-07-08 RX ORDER — POLYETHYLENE GLYCOL 3350 17 G/17G
17 POWDER, FOR SOLUTION ORAL AT BEDTIME
Refills: 0 | Status: DISCONTINUED | OUTPATIENT
Start: 2019-07-08 | End: 2019-07-09

## 2019-07-08 RX ADMIN — PANTOPRAZOLE SODIUM 40 MILLIGRAM(S): 20 TABLET, DELAYED RELEASE ORAL at 05:29

## 2019-07-08 RX ADMIN — Medication 100 MILLIGRAM(S): at 13:27

## 2019-07-08 RX ADMIN — OXYCODONE AND ACETAMINOPHEN 1 TABLET(S): 5; 325 TABLET ORAL at 05:36

## 2019-07-08 RX ADMIN — Medication 5 UNIT(S): at 17:11

## 2019-07-08 RX ADMIN — HEPARIN SODIUM 5000 UNIT(S): 5000 INJECTION INTRAVENOUS; SUBCUTANEOUS at 05:29

## 2019-07-08 RX ADMIN — Medication 145 MILLIGRAM(S): at 11:04

## 2019-07-08 RX ADMIN — SIMVASTATIN 10 MILLIGRAM(S): 20 TABLET, FILM COATED ORAL at 21:04

## 2019-07-08 RX ADMIN — HEPARIN SODIUM 5000 UNIT(S): 5000 INJECTION INTRAVENOUS; SUBCUTANEOUS at 21:04

## 2019-07-08 RX ADMIN — Medication 1 APPLICATION(S): at 05:29

## 2019-07-08 RX ADMIN — Medication 100 MILLIGRAM(S): at 05:29

## 2019-07-08 RX ADMIN — HEPARIN SODIUM 5000 UNIT(S): 5000 INJECTION INTRAVENOUS; SUBCUTANEOUS at 13:27

## 2019-07-08 RX ADMIN — Medication 2: at 17:12

## 2019-07-08 RX ADMIN — SENNA PLUS 2 TABLET(S): 8.6 TABLET ORAL at 21:04

## 2019-07-08 RX ADMIN — Medication 25 MILLIGRAM(S): at 05:29

## 2019-07-08 RX ADMIN — POLYETHYLENE GLYCOL 3350 17 GRAM(S): 17 POWDER, FOR SOLUTION ORAL at 21:04

## 2019-07-08 RX ADMIN — LOSARTAN POTASSIUM 25 MILLIGRAM(S): 100 TABLET, FILM COATED ORAL at 05:30

## 2019-07-08 RX ADMIN — Medication 1 APPLICATION(S): at 17:12

## 2019-07-08 RX ADMIN — Medication 100 MILLIGRAM(S): at 21:04

## 2019-07-08 RX ADMIN — INSULIN GLARGINE 15 UNIT(S): 100 INJECTION, SOLUTION SUBCUTANEOUS at 21:42

## 2019-07-08 NOTE — PROGRESS NOTE ADULT - SUBJECTIVE AND OBJECTIVE BOX
Subjective: 54yFemale with a pmhx of M54.16,[id number],[id number]  ^M54.16,03114,63599,[id number]  Back pain  HTN (hypertension)  HLD (hyperlipidemia)  Diabetes  Spondylolisthesis, lumbar region  Spondylolisthesis, lumbar region  TLIF, 1 level  History of kidney stones  History of bladder surgery    POD#3  s/p L4-L5 TLIF with pedicle screws    Pt seen and examined at bedside. c/o incisional pain at this time.  has some soreness to right groin where the OR pads were cushioned but improved today. States she hasn't had a BM in 4 days.       Allergies    No Known Allergies    Intolerances        Vital Signs Last 24 Hrs  T(C): 36.4 (08 Jul 2019 05:28), Max: 37.1 (07 Jul 2019 21:34)  T(F): 97.6 (08 Jul 2019 05:28), Max: 98.7 (07 Jul 2019 21:34)  HR: 82 (08 Jul 2019 05:28) (82 - 88)  BP: 119/56 (08 Jul 2019 05:28) (107/62 - 123/75)  BP(mean): --  RR: 18 (08 Jul 2019 05:28) (18 - 18)  SpO2: --      acetaminophen   Tablet .. 650 milliGRAM(s) Oral every 4 hours PRN  BACItracin   Ointment 1 Application(s) Topical every 12 hours  bisacodyl Suppository 10 milliGRAM(s) Rectal once PRN  dextrose 40% Gel 15 Gram(s) Oral once PRN  dextrose 5%. 1000 milliLiter(s) IV Continuous <Continuous>  dextrose 50% Injectable 12.5 Gram(s) IV Push once  dextrose 50% Injectable 25 Gram(s) IV Push once  dextrose 50% Injectable 25 Gram(s) IV Push once  docusate sodium 100 milliGRAM(s) Oral three times a day  fenofibrate Tablet 145 milliGRAM(s) Oral daily  glucagon  Injectable 1 milliGRAM(s) IntraMuscular once PRN  heparin  Injectable 5000 Unit(s) SubCutaneous every 8 hours  insulin glargine Injectable (LANTUS) 15 Unit(s) SubCutaneous at bedtime  insulin lispro (HumaLOG) corrective regimen sliding scale   SubCutaneous three times a day before meals  insulin lispro Injectable (HumaLOG) 5 Unit(s) SubCutaneous before breakfast  insulin lispro Injectable (HumaLOG) 5 Unit(s) SubCutaneous before lunch  insulin lispro Injectable (HumaLOG) 5 Unit(s) SubCutaneous before dinner  losartan 25 milliGRAM(s) Oral daily  methocarbamol 750 milliGRAM(s) Oral every 8 hours PRN  metoprolol succinate ER 25 milliGRAM(s) Oral daily  morphine  - Injectable 2 milliGRAM(s) IV Push every 4 hours PRN  oxyCODONE    5 mG/acetaminophen 325 mG 1 Tablet(s) Oral every 4 hours PRN  oxyCODONE    5 mG/acetaminophen 325 mG 2 Tablet(s) Oral every 4 hours PRN  pantoprazole    Tablet 40 milliGRAM(s) Oral before breakfast  polyethylene glycol 3350 17 Gram(s) Oral once  polyethylene glycol 3350 17 Gram(s) Oral at bedtime  senna 2 Tablet(s) Oral at bedtime  simvastatin 10 milliGRAM(s) Oral at bedtime            Exam:  AAOX3. Verbal function intact  follows commands  Motor: MAEx4  5/5 power in b/l UE and LE  Sensation: intact       Wound:  dressing removed, incisions dry and intact      Assessment/Plan: as above  PT/rehab today  given scripts for RW and commode  likely d/c home today vs tomorrow depending on how she does with stairs  miralax and dulcolax  pain meds  d/w attending

## 2019-07-09 ENCOUNTER — TRANSCRIPTION ENCOUNTER (OUTPATIENT)
Age: 54
End: 2019-07-09

## 2019-07-09 VITALS
HEART RATE: 91 BPM | SYSTOLIC BLOOD PRESSURE: 116 MMHG | TEMPERATURE: 97 F | DIASTOLIC BLOOD PRESSURE: 60 MMHG | RESPIRATION RATE: 17 BRPM

## 2019-07-09 LAB
GLUCOSE BLDC GLUCOMTR-MCNC: 121 MG/DL — HIGH (ref 70–99)
GLUCOSE BLDC GLUCOMTR-MCNC: 128 MG/DL — HIGH (ref 70–99)
GLUCOSE BLDC GLUCOMTR-MCNC: 238 MG/DL — HIGH (ref 70–99)

## 2019-07-09 PROCEDURE — 99024 POSTOP FOLLOW-UP VISIT: CPT

## 2019-07-09 RX ORDER — METHOCARBAMOL 500 MG/1
1 TABLET, FILM COATED ORAL
Qty: 21 | Refills: 0
Start: 2019-07-09 | End: 2019-07-15

## 2019-07-09 RX ORDER — SENNA PLUS 8.6 MG/1
2 TABLET ORAL
Qty: 0 | Refills: 0 | DISCHARGE
Start: 2019-07-09

## 2019-07-09 RX ORDER — DOCUSATE SODIUM 100 MG
1 CAPSULE ORAL
Qty: 0 | Refills: 0 | DISCHARGE
Start: 2019-07-09

## 2019-07-09 RX ADMIN — Medication 1 APPLICATION(S): at 05:52

## 2019-07-09 RX ADMIN — Medication 100 MILLIGRAM(S): at 05:49

## 2019-07-09 RX ADMIN — LOSARTAN POTASSIUM 25 MILLIGRAM(S): 100 TABLET, FILM COATED ORAL at 05:53

## 2019-07-09 RX ADMIN — PANTOPRAZOLE SODIUM 40 MILLIGRAM(S): 20 TABLET, DELAYED RELEASE ORAL at 05:49

## 2019-07-09 RX ADMIN — Medication 145 MILLIGRAM(S): at 11:22

## 2019-07-09 RX ADMIN — HEPARIN SODIUM 5000 UNIT(S): 5000 INJECTION INTRAVENOUS; SUBCUTANEOUS at 13:08

## 2019-07-09 RX ADMIN — Medication 25 MILLIGRAM(S): at 05:50

## 2019-07-09 RX ADMIN — HEPARIN SODIUM 5000 UNIT(S): 5000 INJECTION INTRAVENOUS; SUBCUTANEOUS at 05:49

## 2019-07-09 RX ADMIN — Medication 100 MILLIGRAM(S): at 13:08

## 2019-07-09 NOTE — PROGRESS NOTE ADULT - SUBJECTIVE AND OBJECTIVE BOX
Subjective: 54yFemale with a pmhx of M54.16,[id number],[id number]  ^M54.16,19594,61064,[id number],[id number]  Back pain  HTN (hypertension)  HLD (hyperlipidemia)  Diabetes  Spondylolisthesis, lumbar region  Spondylolisthesis, lumbar region  TLIF, 1 level  History of kidney stones  History of bladder surgery      POD#4  s/p L4-L5 TLIF with pedicle screws    Pt seen and examined at bedside. c/o incisional pain at this time. pt had BM x 2 today.     Allergies    No Known Allergies    Intolerances        Vital Signs Last 24 Hrs  T(C): 37.1 (09 Jul 2019 06:00), Max: 37.1 (09 Jul 2019 06:00)  T(F): 98.8 (09 Jul 2019 06:00), Max: 98.8 (09 Jul 2019 06:00)  HR: 77 (09 Jul 2019 06:00) (77 - 97)  BP: 114/81 (09 Jul 2019 06:00) (113/73 - 133/82)  BP(mean): --  RR: 17 (09 Jul 2019 06:00) (17 - 19)  SpO2: --      acetaminophen   Tablet .. 650 milliGRAM(s) Oral every 4 hours PRN  BACItracin   Ointment 1 Application(s) Topical every 12 hours  bisacodyl Suppository 10 milliGRAM(s) Rectal once PRN  dextrose 40% Gel 15 Gram(s) Oral once PRN  dextrose 5%. 1000 milliLiter(s) IV Continuous <Continuous>  dextrose 50% Injectable 12.5 Gram(s) IV Push once  dextrose 50% Injectable 25 Gram(s) IV Push once  dextrose 50% Injectable 25 Gram(s) IV Push once  docusate sodium 100 milliGRAM(s) Oral three times a day  fenofibrate Tablet 145 milliGRAM(s) Oral daily  glucagon  Injectable 1 milliGRAM(s) IntraMuscular once PRN  heparin  Injectable 5000 Unit(s) SubCutaneous every 8 hours  insulin glargine Injectable (LANTUS) 15 Unit(s) SubCutaneous at bedtime  insulin lispro (HumaLOG) corrective regimen sliding scale   SubCutaneous three times a day before meals  insulin lispro Injectable (HumaLOG) 5 Unit(s) SubCutaneous before breakfast  insulin lispro Injectable (HumaLOG) 5 Unit(s) SubCutaneous before lunch  insulin lispro Injectable (HumaLOG) 5 Unit(s) SubCutaneous before dinner  losartan 25 milliGRAM(s) Oral daily  methocarbamol 750 milliGRAM(s) Oral every 8 hours PRN  metoprolol succinate ER 25 milliGRAM(s) Oral daily  morphine  - Injectable 2 milliGRAM(s) IV Push every 4 hours PRN  oxyCODONE    5 mG/acetaminophen 325 mG 1 Tablet(s) Oral every 4 hours PRN  oxyCODONE    5 mG/acetaminophen 325 mG 2 Tablet(s) Oral every 4 hours PRN  pantoprazole    Tablet 40 milliGRAM(s) Oral before breakfast  polyethylene glycol 3350 17 Gram(s) Oral at bedtime  senna 2 Tablet(s) Oral at bedtime  simvastatin 10 milliGRAM(s) Oral at bedtime            Exam:  AAOX3. Verbal function intact  follows commands  Motor: MAEx4  good strength b/l   Sensation: intact         Wound: clean and intact      Assessment/Plan: as above  stable for d/c home  will discuss w attg

## 2019-07-09 NOTE — DISCHARGE NOTE PROVIDER - HOSPITAL COURSE
Patient is a 53 yo female admitted for elective TLIF L4-L5 pedicle screw fixation.  Patietn did well with PT post op and admitted relief of preop symptoms.  Pt had BM prior to discharge.  Given Rx for rolling walker and commode.  Pt stable for discharge home.

## 2019-07-09 NOTE — DISCHARGE NOTE NURSING/CASE MANAGEMENT/SOCIAL WORK - NSDCDPATPORTLINK_GEN_ALL_CORE
You can access the [facility], offered by [facility], by registering with the following website: [email or web address]

## 2019-07-09 NOTE — DISCHARGE NOTE PROVIDER - CARE PROVIDER_API CALL
Araceli Wisdom)  [facility]  [address] [place] [address], [address]  [place] [address]  Phone: [phone]  Fax: [phone]  Follow Up Time:

## 2019-07-10 LAB — SURGICAL PATHOLOGY STUDY: SIGNIFICANT CHANGE UP

## 2019-07-16 PROBLEM — I10 ESSENTIAL (PRIMARY) HYPERTENSION: Chronic | Status: ACTIVE | Noted: 2019-07-05

## 2019-07-16 PROBLEM — E11.9 TYPE 2 DIABETES MELLITUS WITHOUT COMPLICATIONS: Chronic | Status: ACTIVE | Noted: 2019-07-05

## 2019-07-16 PROBLEM — E78.5 HYPERLIPIDEMIA, UNSPECIFIED: Chronic | Status: ACTIVE | Noted: 2019-07-05

## 2019-07-16 PROBLEM — M54.9 DORSALGIA, UNSPECIFIED: Chronic | Status: ACTIVE | Noted: 2019-07-05

## 2019-07-23 ENCOUNTER — APPOINTMENT (OUTPATIENT)
Dept: NEUROSURGERY | Facility: CLINIC | Age: 54
End: 2019-07-23
Payer: MEDICAID

## 2019-07-23 PROCEDURE — 99024 POSTOP FOLLOW-UP VISIT: CPT

## 2019-07-23 NOTE — HISTORY OF PRESENT ILLNESS
[FreeTextEntry1] : Patient presents today s/p LTIF done on 7/5/19. Patient complaining of bilateral hip pain, and no longer has pain down her legs. Denies numbness and tingling. Sutures were removed, and applied bacitracin. Surgical site is healing, no evidence of infection.

## 2019-08-20 ENCOUNTER — OUTPATIENT (OUTPATIENT)
Dept: OUTPATIENT SERVICES | Facility: HOSPITAL | Age: 54
LOS: 1 days | End: 2019-08-20
Payer: MEDICAID

## 2019-08-20 ENCOUNTER — APPOINTMENT (OUTPATIENT)
Dept: RADIOLOGY | Facility: CLINIC | Age: 54
End: 2019-08-20

## 2019-08-20 ENCOUNTER — APPOINTMENT (OUTPATIENT)
Dept: NEUROSURGERY | Facility: CLINIC | Age: 54
End: 2019-08-20
Payer: MEDICAID

## 2019-08-20 DIAGNOSIS — Z87.442 PERSONAL HISTORY OF URINARY CALCULI: Chronic | ICD-10-CM

## 2019-08-20 DIAGNOSIS — Z98.890 OTHER SPECIFIED POSTPROCEDURAL STATES: Chronic | ICD-10-CM

## 2019-08-20 PROCEDURE — 72100 X-RAY EXAM L-S SPINE 2/3 VWS: CPT

## 2019-08-20 PROCEDURE — 99024 POSTOP FOLLOW-UP VISIT: CPT

## 2019-08-20 PROCEDURE — 72100 X-RAY EXAM L-S SPINE 2/3 VWS: CPT | Mod: 26

## 2019-08-21 NOTE — PHYSICAL EXAM
[FreeTextEntry1] : Constitutional: Well appearing, no distress\par HEENT: Normocephalic Atraumatic\par Psychiatric: Alert and oriented to all spheres, normal mood\par Pulmonary: no respiratory distress\par Abdomen: non-distended\par Vascular/Extremities: no edema, no cyanosis, no clubbing\par \par \par Neurologic: \par CN II-XII grossly intact\par ROM: Full in cervical and lumbar spine\par Palpation: no pain to palpation in cervical spine, no pain to palpation in lumbar spine\par Strength: Full strength in all major muscle groups, no atrophy\par Sensation: Full sensation to light touch in all extremities\par Reflexes: \par               2+ patellar\par               2+ biceps\par               2+ ankle jerk\par              No Rodriguez's\par              No clonus\par              No babinski\par \par Signs:\par SLR negative\par L'hermitte's negative\par \par Gait: toe, heel, tandem fluid, using cane minimally. \par \par Incision well healed\par \par \par \par \par

## 2019-08-21 NOTE — HISTORY OF PRESENT ILLNESS
[FreeTextEntry1] : Pt doing very well s/p TLIF. Radicular pain resolved. post op back pain resolving. Ambulating well. Incisions well healed. X-ray reviewed today shows excellent hardware position. Pt notes some numbness in RLE. Should improve. Recc Start neurontin at night.

## 2019-11-26 ENCOUNTER — APPOINTMENT (OUTPATIENT)
Dept: NEUROSURGERY | Facility: CLINIC | Age: 54
End: 2019-11-26
Payer: MEDICAID

## 2019-11-26 VITALS
HEIGHT: 63 IN | DIASTOLIC BLOOD PRESSURE: 85 MMHG | BODY MASS INDEX: 25.69 KG/M2 | SYSTOLIC BLOOD PRESSURE: 133 MMHG | WEIGHT: 145 LBS | HEART RATE: 80 BPM | OXYGEN SATURATION: 97 %

## 2019-11-26 PROCEDURE — 99213 OFFICE O/P EST LOW 20 MIN: CPT

## 2019-12-31 ENCOUNTER — APPOINTMENT (OUTPATIENT)
Dept: NEUROSURGERY | Facility: CLINIC | Age: 54
End: 2019-12-31
Payer: MEDICAID

## 2019-12-31 VITALS
WEIGHT: 145 LBS | OXYGEN SATURATION: 99 % | BODY MASS INDEX: 25.69 KG/M2 | DIASTOLIC BLOOD PRESSURE: 70 MMHG | HEIGHT: 63 IN | HEART RATE: 79 BPM | SYSTOLIC BLOOD PRESSURE: 105 MMHG

## 2019-12-31 PROCEDURE — 99213 OFFICE O/P EST LOW 20 MIN: CPT

## 2019-12-31 RX ORDER — CHLORZOXAZONE 500 MG/1
500 TABLET ORAL EVERY 8 HOURS
Qty: 90 | Refills: 1 | Status: ACTIVE | COMMUNITY
Start: 2019-12-31 | End: 1900-01-01

## 2019-12-31 NOTE — HISTORY OF PRESENT ILLNESS
[FreeTextEntry1] : Patient presents today in follow up reporting of muscle spams in her buttocks and sensation of tightness on her right lower extremity. Patient cannot go to physical therapy due to copay. She reports of doing minimal stretching at home. Recommend patient to do stretches more at least two to three times a day and apply heat compress. On exam moves all extremities 5/5.

## 2020-06-09 ENCOUNTER — APPOINTMENT (OUTPATIENT)
Dept: NEUROSURGERY | Facility: CLINIC | Age: 55
End: 2020-06-09
Payer: MEDICAID

## 2020-06-09 VITALS
DIASTOLIC BLOOD PRESSURE: 78 MMHG | WEIGHT: 145 LBS | BODY MASS INDEX: 25.69 KG/M2 | HEIGHT: 63 IN | SYSTOLIC BLOOD PRESSURE: 119 MMHG

## 2020-06-09 DIAGNOSIS — M43.06 SPONDYLOLYSIS, LUMBAR REGION: ICD-10-CM

## 2020-06-09 DIAGNOSIS — M54.16 RADICULOPATHY, LUMBAR REGION: ICD-10-CM

## 2020-06-09 PROCEDURE — 99213 OFFICE O/P EST LOW 20 MIN: CPT

## 2020-06-09 RX ORDER — GABAPENTIN 300 MG/1
300 CAPSULE ORAL EVERY 8 HOURS
Qty: 90 | Refills: 5 | Status: DISCONTINUED | COMMUNITY
Start: 2019-06-05 | End: 2020-06-09

## 2020-06-09 NOTE — HISTORY OF PRESENT ILLNESS
[FreeTextEntry1] : I am seeing Ms. Bartlett in f/u s/p lumbar fusion. Since she has been unable to participate in Pt she has had some continued buttock and calf pain. It is not as severe as pre op. Overall her mobility is significantly improved after surgery. she is still reticent about Pt given COVID. She has not had a recent BONNIE. She deniesd weakness, bowel/bladder dysfunction.

## 2022-08-08 NOTE — ASU PATIENT PROFILE, ADULT - PSH
Time-based billing (NON-critical care) History of bladder surgery    History of kidney stones  lipotripsy small kidney stones history

## 2023-06-22 NOTE — PATIENT PROFILE ADULT - NSPROHMCARDIOSYMPCOND_GEN_A_NUR
Ana M Doe, DO  You 2 minutes ago (10:27 AM)       She can try benadryl and compazine. If headache is refractory to that, she needs to come in to OB triage (benadryl 25 mg PO + compazine 10 mg PO, no refills)     AC         hypertension